# Patient Record
Sex: FEMALE | URBAN - METROPOLITAN AREA
[De-identification: names, ages, dates, MRNs, and addresses within clinical notes are randomized per-mention and may not be internally consistent; named-entity substitution may affect disease eponyms.]

---

## 2023-05-03 ENCOUNTER — CLAIMS CREATED FROM THE CLAIM WINDOW (OUTPATIENT)
Dept: URBAN - METROPOLITAN AREA MEDICAL CENTER 16 | Facility: MEDICAL CENTER | Age: 48
End: 2023-05-03
Payer: COMMERCIAL

## 2023-05-03 ENCOUNTER — OUT OF OFFICE VISIT (OUTPATIENT)
Dept: URBAN - METROPOLITAN AREA MEDICAL CENTER 16 | Facility: MEDICAL CENTER | Age: 48
End: 2023-05-03

## 2023-05-03 DIAGNOSIS — D61.818 PANCYTOPENIA: ICD-10-CM

## 2023-05-03 DIAGNOSIS — B17.10 ACUTE HEPATITIS C: ICD-10-CM

## 2023-05-03 DIAGNOSIS — D64.89 ANEMIA DUE TO OTHER CAUSE: ICD-10-CM

## 2023-05-03 DIAGNOSIS — R74.01 ABNORMAL/ELEVATED TRANSAMINASE (SGOT, AMINOTRANSFERASE): ICD-10-CM

## 2023-05-03 PROCEDURE — G8427 DOCREV CUR MEDS BY ELIG CLIN: HCPCS | Performed by: INTERNAL MEDICINE

## 2023-05-03 PROCEDURE — 99222 1ST HOSP IP/OBS MODERATE 55: CPT | Performed by: INTERNAL MEDICINE

## 2023-08-23 ENCOUNTER — LAB OUTSIDE AN ENCOUNTER (OUTPATIENT)
Dept: URBAN - METROPOLITAN AREA CLINIC 118 | Facility: CLINIC | Age: 48
End: 2023-08-23

## 2023-08-23 ENCOUNTER — OFFICE VISIT (OUTPATIENT)
Dept: URBAN - METROPOLITAN AREA CLINIC 118 | Facility: CLINIC | Age: 48
End: 2023-08-23
Payer: COMMERCIAL

## 2023-08-23 ENCOUNTER — DASHBOARD ENCOUNTERS (OUTPATIENT)
Age: 48
End: 2023-08-23

## 2023-08-23 VITALS
SYSTOLIC BLOOD PRESSURE: 103 MMHG | HEIGHT: 67 IN | HEART RATE: 92 BPM | BODY MASS INDEX: 19.15 KG/M2 | TEMPERATURE: 98.2 F | WEIGHT: 122 LBS | DIASTOLIC BLOOD PRESSURE: 71 MMHG

## 2023-08-23 DIAGNOSIS — B18.2 HEP C W/O COMA, CHRONIC: ICD-10-CM

## 2023-08-23 DIAGNOSIS — Z12.11 SCREEN FOR COLON CANCER: ICD-10-CM

## 2023-08-23 DIAGNOSIS — D64.89 ANEMIA DUE TO OTHER CAUSE: ICD-10-CM

## 2023-08-23 PROBLEM — 128302006: Status: ACTIVE | Noted: 2023-08-23

## 2023-08-23 PROCEDURE — 99204 OFFICE O/P NEW MOD 45 MIN: CPT | Performed by: INTERNAL MEDICINE

## 2023-08-23 NOTE — HPI-TODAY'S VISIT:
pt presents for GI evaluation. pt h/o polysubstance abuse recently admitted for osteomyelitis now presents for GI evaluation. pt noted Hep C + with increased LFT's and told she may need treatment. pt denies jaundice or other liver related complaints. pt also noted to be anemic with heme + stool but further evaluation deferred until stable as outpt. pt denies rectal bleeding, abdominal pain or other LGI symptoms. No recent weight loss. No UGI complaints at this time. pt has enver had a colonoscopy.

## 2023-08-23 NOTE — PHYSICAL EXAM MUSCULOSKELETAL:
normal gait and station , no tenderness or deformities present
PAST SURGICAL HISTORY:  Status post ORIF of fracture of ankle

## 2023-08-27 LAB
A/G RATIO: 1.8
ABSOLUTE BASOPHILS: 18
ABSOLUTE EOSINOPHILS: 72
ABSOLUTE LYMPHOCYTES: 1998
ABSOLUTE MONOCYTES: 390
ABSOLUTE NEUTROPHILS: 3522
ALBUMIN: 4.9
ALKALINE PHOSPHATASE: 64
ALT (SGPT): 25
AST (SGOT): 20
BASOPHILS: 0.3
BILIRUBIN, TOTAL: 0.5
BUN/CREATININE RATIO: (no result)
BUN: 8
CALCIUM: 10.3
CARBON DIOXIDE, TOTAL: 26
CHLORIDE: 106
CREATININE: 0.67
EGFR: 109
EOSINOPHILS: 1.2
GLOBULIN, TOTAL: 2.8
GLUCOSE: 83
HEMATOCRIT: 41.3
HEMOGLOBIN: 12.6
HEPATITIS C VIRAL RNA: (no result)
LYMPHOCYTES: 33.3
MCH: 24
MCHC: 30.5
MCV: 78.5
MONOCYTES: 6.5
MPV: 12.3
NEUTROPHILS: 58.7
PLATELET COUNT: 290
POTASSIUM: 3.9
PROTEIN, TOTAL: 7.7
RDW: 14.8
RED BLOOD CELL COUNT: 5.26
SODIUM: 140
WHITE BLOOD CELL COUNT: 6

## 2023-09-01 ENCOUNTER — TELEPHONE ENCOUNTER (OUTPATIENT)
Dept: URBAN - METROPOLITAN AREA CLINIC 118 | Facility: CLINIC | Age: 48
End: 2023-09-01

## 2023-09-06 ENCOUNTER — TELEPHONE ENCOUNTER (OUTPATIENT)
Dept: URBAN - METROPOLITAN AREA CLINIC 118 | Facility: CLINIC | Age: 48
End: 2023-09-06

## 2023-11-08 ENCOUNTER — LAB OUTSIDE AN ENCOUNTER (OUTPATIENT)
Dept: URBAN - METROPOLITAN AREA CLINIC 118 | Facility: CLINIC | Age: 48
End: 2023-11-08

## 2023-11-10 LAB
HCV RNA, QUANTITATIVE: (no result)
HCV RNA, QUANTITATIVE: 4.97

## 2023-11-16 ENCOUNTER — TELEPHONE ENCOUNTER (OUTPATIENT)
Dept: URBAN - METROPOLITAN AREA CLINIC 118 | Facility: CLINIC | Age: 48
End: 2023-11-16

## 2023-11-16 RX ORDER — GLECAPREVIR AND PIBRENTASVIR 40; 100 MG/1; MG/1
3 TABLETS TABLET, FILM COATED ORAL ONCE A DAY
Qty: 168 TABLET | Refills: 0 | OUTPATIENT
Start: 2023-11-16 | End: 2024-01-11

## 2023-11-17 ENCOUNTER — TELEPHONE ENCOUNTER (OUTPATIENT)
Dept: URBAN - METROPOLITAN AREA CLINIC 117 | Facility: CLINIC | Age: 48
End: 2023-11-17

## 2023-11-17 ENCOUNTER — OFFICE VISIT (OUTPATIENT)
Dept: URBAN - METROPOLITAN AREA MEDICAL CENTER 16 | Facility: MEDICAL CENTER | Age: 48
End: 2023-11-17
Payer: COMMERCIAL

## 2023-11-17 ENCOUNTER — TELEPHONE ENCOUNTER (OUTPATIENT)
Dept: URBAN - METROPOLITAN AREA CLINIC 118 | Facility: CLINIC | Age: 48
End: 2023-11-17

## 2023-11-17 DIAGNOSIS — Z12.11 COLON CANCER SCREENING: ICD-10-CM

## 2023-11-17 PROCEDURE — G0121 COLON CA SCRN NOT HI RSK IND: HCPCS | Performed by: INTERNAL MEDICINE

## 2023-11-17 RX ORDER — GLECAPREVIR AND PIBRENTASVIR 40; 100 MG/1; MG/1
3 TABLETS TABLET, FILM COATED ORAL ONCE A DAY
Qty: 168 TABLET | Refills: 0 | OUTPATIENT
Start: 2023-11-17 | End: 2024-01-12

## 2023-11-17 RX ORDER — GLECAPREVIR AND PIBRENTASVIR 40; 100 MG/1; MG/1
3 TABLETS TABLET, FILM COATED ORAL ONCE A DAY
Qty: 168 TABLET | Refills: 0 | Status: ACTIVE | COMMUNITY
Start: 2023-11-16 | End: 2024-01-11